# Patient Record
Sex: FEMALE | Race: WHITE | NOT HISPANIC OR LATINO | URBAN - NONMETROPOLITAN AREA
[De-identification: names, ages, dates, MRNs, and addresses within clinical notes are randomized per-mention and may not be internally consistent; named-entity substitution may affect disease eponyms.]

---

## 2017-09-18 PROBLEM — H43.813: Noted: 2017-09-18

## 2017-09-18 PROBLEM — H04.123: Noted: 2017-09-18

## 2017-09-18 PROBLEM — H52.4: Noted: 2019-11-01

## 2017-09-18 PROBLEM — H52.13: Noted: 2017-09-18

## 2017-09-18 PROBLEM — H52.223: Noted: 2019-11-01

## 2017-09-18 PROBLEM — Z96.1: Noted: 2017-09-18

## 2019-10-30 ENCOUNTER — IMPORTED ENCOUNTER (OUTPATIENT)
Dept: URBAN - NONMETROPOLITAN AREA CLINIC 1 | Facility: CLINIC | Age: 74
End: 2019-10-30

## 2019-10-30 PROCEDURE — 92014 COMPRE OPH EXAM EST PT 1/>: CPT

## 2019-10-30 PROCEDURE — 92015 DETERMINE REFRACTIVE STATE: CPT

## 2021-01-07 NOTE — PATIENT DISCUSSION
Pseudophakia OU w/PCO OD-  discussed findings w/patient-  some PCO noted OD no treatment indicated-  monitor yearly or prn OSORIO OU -  discussed findings w/patient-  recommend tears PRN OU -  monitor yearly or prnPVD OU -  discussed findings w/patient-  no holes tears or detachments seen today -  reviewed the S&S associated.  Patient instructed to call or come in if changes in vision are noted from today -  monitor yearly or prn Compound Myopic Astigmatism OU w/Presbyopia-  discussed findings w/patient -  new spectacle Rx issued-  monitor yearly or prn; 's Notes: MR 10/30/2019DFE 10/30/2019 N/A

## 2021-03-03 ENCOUNTER — IMPORTED ENCOUNTER (OUTPATIENT)
Dept: URBAN - NONMETROPOLITAN AREA CLINIC 1 | Facility: CLINIC | Age: 76
End: 2021-03-03

## 2021-03-03 PROCEDURE — 92014 COMPRE OPH EXAM EST PT 1/>: CPT

## 2021-03-03 PROCEDURE — 92015 DETERMINE REFRACTIVE STATE: CPT

## 2021-03-03 NOTE — PATIENT DISCUSSION
Pseudophakia OU w/PCO OD-  discussed findings w/patient-  some PCO noted OD no treatment indicated-  monitor yearly or prn OSORIO OU -  discussed findings w/patient-  worsening noted at this time-  increase AT's to at least BID OUsamples given-  patient should use more drops if she is reading extensively-  monitor yearly or prnPVD OU -  discussed findings w/patient-  no holes tears or detachments seen today -  reviewed the S&S associated.  Patient instructed to call or come in if changes in vision are noted from today -  monitor yearly or prn Compound Myopic Astigmatism OU w/Presbyopia-  discussed findings w/patient -  new spectacle Rx issued-  monitor yearly or prn; 's Notes: MR 3/3/2021DFE 3/3/2021

## 2022-04-15 ASSESSMENT — TONOMETRY
OS_IOP_MMHG: 17
OD_IOP_MMHG: 16
OS_IOP_MMHG: 16
OD_IOP_MMHG: 16

## 2022-04-15 ASSESSMENT — VISUAL ACUITY
OS_SC: 20/20
OD_CC: 20/30
OS_SC: 20/25+
OU_CC: 20/20
OD_SC: 20/20
OS_CC: 20/25